# Patient Record
Sex: FEMALE
[De-identification: names, ages, dates, MRNs, and addresses within clinical notes are randomized per-mention and may not be internally consistent; named-entity substitution may affect disease eponyms.]

---

## 2020-02-12 PROBLEM — Z00.00 ENCOUNTER FOR PREVENTIVE HEALTH EXAMINATION: Status: ACTIVE | Noted: 2020-02-12

## 2020-07-15 ENCOUNTER — APPOINTMENT (OUTPATIENT)
Dept: ENDOCRINOLOGY | Facility: CLINIC | Age: 55
End: 2020-07-15

## 2020-07-15 ENCOUNTER — APPOINTMENT (OUTPATIENT)
Dept: ENDOCRINOLOGY | Facility: CLINIC | Age: 55
End: 2020-07-15
Payer: COMMERCIAL

## 2020-07-15 VITALS
WEIGHT: 133 LBS | HEART RATE: 70 BPM | HEIGHT: 62 IN | BODY MASS INDEX: 24.48 KG/M2 | SYSTOLIC BLOOD PRESSURE: 122 MMHG | DIASTOLIC BLOOD PRESSURE: 70 MMHG

## 2020-07-15 DIAGNOSIS — Z78.9 OTHER SPECIFIED HEALTH STATUS: ICD-10-CM

## 2020-07-15 DIAGNOSIS — Z86.39 PERSONAL HISTORY OF OTHER ENDOCRINE, NUTRITIONAL AND METABOLIC DISEASE: ICD-10-CM

## 2020-07-15 DIAGNOSIS — E78.5 HYPERLIPIDEMIA, UNSPECIFIED: ICD-10-CM

## 2020-07-15 DIAGNOSIS — L65.9 NONSCARRING HAIR LOSS, UNSPECIFIED: ICD-10-CM

## 2020-07-15 DIAGNOSIS — Z80.9 FAMILY HISTORY OF MALIGNANT NEOPLASM, UNSPECIFIED: ICD-10-CM

## 2020-07-15 DIAGNOSIS — Z81.8 FAMILY HISTORY OF OTHER MENTAL AND BEHAVIORAL DISORDERS: ICD-10-CM

## 2020-07-15 PROCEDURE — 99204 OFFICE O/P NEW MOD 45 MIN: CPT

## 2020-07-15 NOTE — HISTORY OF PRESENT ILLNESS
[FreeTextEntry1] : Quality: Hashimoto's hypothyroid\par Duration/Onset: Dx 20+ year ago and had been on LT4 137 mcg daily at that time but stopped it due to hair loss.  She had been on/off for several year. Prior to recent labs she had been off it for 5 years due to lack of insurance.\par Severity: severe\par Associated symptoms: denies anterior neck pain, dysphagia or voice changes. complains of hair loss with thyroid hormone. \par \par MODIFYING FACTORS: \par Medication history: as above\par Current thyroid medication: Started LT4 50 mcg 1/2020 and then switched to brand Synthroid 75 mcg daily since end of June 2020\par \par \par \par

## 2020-07-15 NOTE — CONSULT LETTER
[Dear  ___] : Dear  [unfilled], [Consult Letter:] : I had the pleasure of evaluating your patient, [unfilled]. [Please see my note below.] : Please see my note below. [Consult Closing:] : Thank you very much for allowing me to participate in the care of this patient.  If you have any questions, please do not hesitate to contact me. [Sincerely,] : Sincerely, [FreeTextEntry3] : Becky Kumar, \par

## 2020-07-15 NOTE — DATA REVIEWED
[FreeTextEntry1] : Thyroid sono 2/5/15 - heterogeneous hypervascular gland, no thyroid enlargement, no thyroid nodules\par \par Labs 1/7/20 (+) TPO ab 181, T4 1.7, , Ft4 0.21\par \par Labs 3/12/20\par \par \par \par labs 6/26/20\par \par T4 3.5\par FT4 0.51\par (+) COVID ab

## 2020-07-15 NOTE — REVIEW OF SYSTEMS
[Nocturia] : nocturia [Hair Loss] : hair loss [Insomnia] : insomnia [Cold Intolerance] : cold intolerance [Negative] : Neurological [FreeTextEntry3] : (+) reading glasses [FreeTextEntry9] : Right knee discomfort

## 2020-07-15 NOTE — ASSESSMENT
[FreeTextEntry1] : 55 year old female with hashimoto's hypothyroid with severe hypothyroidism after being off thyroid hormone for many years.  Minimal response with low dose thyroid hormone in past 6 months and she had been on much higher doses in past. SHe also has hair loss and HLD\par - restart Synthroid 137 mcg daily, repeat TFTs 8 weeks\par - risks of untreated hypothyroidism discussed, advised that she adhere with thyroid hormone daily\par - hair loss unlike due to thyroid hormone but may be worsening by untreated thyroid disease, consider derm eval after normalization of thyroid levels\par - HLD likely due to hypothyroid, no statin at this time, repeat levels when euthyroid\par - all questions answered\par

## 2020-07-15 NOTE — PHYSICAL EXAM
[Well Nourished] : well nourished [Alert] : alert [Healthy Appearance] : healthy appearance [No Acute Distress] : no acute distress [EOMI] : extra ocular movement intact [No LAD] : no lymphadenopathy [No Thyroid Nodules] : no palpable thyroid nodules [Thyroid Not Enlarged] : the thyroid was not enlarged [No Respiratory Distress] : no respiratory distress [No Accessory Muscle Use] : no accessory muscle use [Clear to Auscultation] : lungs were clear to auscultation bilaterally [Normal S1, S2] : normal S1 and S2 [Normal Rate] : heart rate was normal [No Edema] : no peripheral edema [Normal Gait] : normal gait [No Rash] : no rash [Cranial Nerves Intact] : cranial nerves 2-12 were intact [Oriented x3] : oriented to person, place, and time [Normal Affect] : the affect was normal [Normal Mood] : the mood was normal [Normal Insight/Judgement] : insight and judgment were intact [de-identified] : (+) tattoes [de-identified] : delayed relaxation phase of DTRs

## 2020-08-07 ENCOUNTER — APPOINTMENT (OUTPATIENT)
Dept: ENDOCRINOLOGY | Facility: CLINIC | Age: 55
End: 2020-08-07

## 2020-09-19 ENCOUNTER — APPOINTMENT (OUTPATIENT)
Dept: ENDOCRINOLOGY | Facility: CLINIC | Age: 55
End: 2020-09-19

## 2020-09-25 ENCOUNTER — APPOINTMENT (OUTPATIENT)
Dept: ENDOCRINOLOGY | Facility: CLINIC | Age: 55
End: 2020-09-25
Payer: COMMERCIAL

## 2020-09-25 VITALS
HEART RATE: 74 BPM | SYSTOLIC BLOOD PRESSURE: 120 MMHG | BODY MASS INDEX: 24.66 KG/M2 | HEIGHT: 62 IN | OXYGEN SATURATION: 98 % | DIASTOLIC BLOOD PRESSURE: 76 MMHG | TEMPERATURE: 97.6 F | WEIGHT: 134 LBS

## 2020-09-25 PROCEDURE — 99213 OFFICE O/P EST LOW 20 MIN: CPT

## 2020-09-25 RX ORDER — LEVOTHYROXINE SODIUM 0.12 MG/1
125 TABLET ORAL
Refills: 0 | Status: DISCONTINUED | COMMUNITY
End: 2020-09-25

## 2020-09-25 NOTE — PHYSICAL EXAM
[Alert] : alert [Well Nourished] : well nourished [Healthy Appearance] : healthy appearance [No Acute Distress] : no acute distress [EOMI] : extra ocular movement intact [No LAD] : no lymphadenopathy [Thyroid Not Enlarged] : the thyroid was not enlarged [No Thyroid Nodules] : no palpable thyroid nodules [No Respiratory Distress] : no respiratory distress [No Accessory Muscle Use] : no accessory muscle use [Clear to Auscultation] : lungs were clear to auscultation bilaterally [Normal S1, S2] : normal S1 and S2 [Normal Rate] : heart rate was normal [No Edema] : no peripheral edema [Normal Gait] : normal gait [Cranial Nerves Intact] : cranial nerves 2-12 were intact [Oriented x3] : oriented to person, place, and time [Normal Affect] : the affect was normal [Normal Insight/Judgement] : insight and judgment were intact [Normal Mood] : the mood was normal [de-identified] : (+) tattoes [de-identified] : brisk reflexes, (+) mild hand tremors

## 2020-09-25 NOTE — HISTORY OF PRESENT ILLNESS
[FreeTextEntry1] : Quality: Hashimoto's hypothyroid\par Duration/Onset: Dx 20+ year ago and had been on LT4 137 mcg daily at that time but stopped it due to hair loss.  She had been on/off for several year. Prior to initial visit she had been off it for 5 years due to lack of insurance.\par Severity: severe\par Associated symptoms: denies anterior neck pain, dysphagia or voice changes. complains of hair loss with thyroid hormone. improved hair loss but still thin\par \par MODIFYING FACTORS: \par Medication history: as above. since last visit Lt4 137 mcg daiy and has been adherent.  PCp lowered dose to 125 mcg about 2 weeks ago but pt feels that this may still be too much b/c she developed muscle pains.  She refuses to take this and has used leftover 100 mcg daily and feels better.\par \par \par \par

## 2020-09-25 NOTE — REVIEW OF SYSTEMS
[Nocturia] : nocturia [Hair Loss] : hair loss [Insomnia] : insomnia [Cold Intolerance] : cold intolerance [Recent Weight Gain (___ Lbs)] : no recent weight gain [Recent Weight Loss (___ Lbs)] : no recent weight loss [Palpitations] : no palpitations [Shortness Of Breath] : no shortness of breath [Diarrhea] : no diarrhea [Tremors] : no tremors [FreeTextEntry3] : (+) reading glasses [FreeTextEntry9] : Right knee discomfort

## 2020-09-25 NOTE — DATA REVIEWED
[FreeTextEntry1] : Thyroid sono 2/5/15 - heterogeneous hypervascular gland, no thyroid enlargement, no thyroid nodules\par \par Labs 1/7/20 (+) TPO ab 181, T4 1.7, , Ft4 0.21\par \par Labs 3/12/20\par \par \par \par labs 6/26/20\par \par T4 3.5\par FT4 0.51\par (+) COVID ab\par \par labs 9/4/20 TSH 0.130, Ft4 2.19, T4 12.7, normal chol levels

## 2020-09-25 NOTE — ASSESSMENT
[FreeTextEntry1] : 55 year old female with hashimoto's hypothyroid - now thyrotoxic after adherence with thyroid hormone\par - decrease Synthroid 100 mcg daily ( pt did not tolerate 125 mcg daily), repeat TFTs 6 weeks\par - chol levels normalized\par \par

## 2020-11-04 ENCOUNTER — NON-APPOINTMENT (OUTPATIENT)
Age: 55
End: 2020-11-04

## 2020-11-06 ENCOUNTER — APPOINTMENT (OUTPATIENT)
Dept: ENDOCRINOLOGY | Facility: CLINIC | Age: 55
End: 2020-11-06

## 2020-12-07 ENCOUNTER — APPOINTMENT (OUTPATIENT)
Dept: ENDOCRINOLOGY | Facility: CLINIC | Age: 55
End: 2020-12-07

## 2020-12-11 ENCOUNTER — LABORATORY RESULT (OUTPATIENT)
Age: 55
End: 2020-12-11

## 2020-12-11 ENCOUNTER — APPOINTMENT (OUTPATIENT)
Dept: ENDOCRINOLOGY | Facility: CLINIC | Age: 55
End: 2020-12-11
Payer: COMMERCIAL

## 2020-12-11 VITALS
HEART RATE: 88 BPM | OXYGEN SATURATION: 97 % | BODY MASS INDEX: 26.31 KG/M2 | HEIGHT: 60 IN | SYSTOLIC BLOOD PRESSURE: 110 MMHG | WEIGHT: 134 LBS | DIASTOLIC BLOOD PRESSURE: 66 MMHG

## 2020-12-11 DIAGNOSIS — E03.9 HYPOTHYROIDISM, UNSPECIFIED: ICD-10-CM

## 2020-12-11 DIAGNOSIS — M89.8X9 OTHER SPECIFIED DISORDERS OF BONE, UNSPECIFIED SITE: ICD-10-CM

## 2020-12-11 PROCEDURE — 36415 COLL VENOUS BLD VENIPUNCTURE: CPT

## 2020-12-11 PROCEDURE — 99072 ADDL SUPL MATRL&STAF TM PHE: CPT

## 2020-12-11 PROCEDURE — 99213 OFFICE O/P EST LOW 20 MIN: CPT | Mod: 25

## 2020-12-11 RX ORDER — LEVOTHYROXINE SODIUM 0.1 MG/1
100 TABLET ORAL
Qty: 90 | Refills: 1 | Status: ACTIVE | COMMUNITY
Start: 2020-09-25 | End: 1900-01-01

## 2020-12-11 NOTE — PHYSICAL EXAM
[Alert] : alert [Well Nourished] : well nourished [Healthy Appearance] : healthy appearance [No Acute Distress] : no acute distress [EOMI] : extra ocular movement intact [No LAD] : no lymphadenopathy [Thyroid Not Enlarged] : the thyroid was not enlarged [No Thyroid Nodules] : no palpable thyroid nodules [No Respiratory Distress] : no respiratory distress [No Accessory Muscle Use] : no accessory muscle use [Clear to Auscultation] : lungs were clear to auscultation bilaterally [Normal S1, S2] : normal S1 and S2 [Normal Rate] : heart rate was normal [No Edema] : no peripheral edema [Normal Gait] : normal gait [Cranial Nerves Intact] : cranial nerves 2-12 were intact [Oriented x3] : oriented to person, place, and time [Normal Affect] : the affect was normal [Normal Insight/Judgement] : insight and judgment were intact [Normal Mood] : the mood was normal [Normal Reflexes] : deep tendon reflexes were 2+ and symmetric [No Tremors] : no tremors [de-identified] : (+) tattoes

## 2020-12-11 NOTE — REVIEW OF SYSTEMS
[Hair Loss] : hair loss [Insomnia] : insomnia [Cold Intolerance] : cold intolerance [Recent Weight Gain (___ Lbs)] : no recent weight gain [Recent Weight Loss (___ Lbs)] : no recent weight loss [Palpitations] : no palpitations [Shortness Of Breath] : no shortness of breath [Diarrhea] : no diarrhea [As Noted in HPI] : as noted in HPI [Tremors] : no tremors [FreeTextEntry3] : (+) reading glasses

## 2020-12-11 NOTE — ASSESSMENT
[FreeTextEntry1] : 55 year old female with hashimoto's hypothyroid - TSH improved since 6/2020 but now slightly elevated after switching to generic LT4.  She does not want brand Synthroid and does not want to increase LT4 at this time\par - risks of hypothyroidism discussed, risks of untreated hypothyroidism discussed\par - cont LT4 100 mcg daily, repeat labs 3 months, pt will try and get same generic  (Sandos) from pharmacy\par - advised against [facility] thyroid due to dosing inconsistence\par \par (+) bone pains, stiffness unlikely due to medication- check BMP and VIt D, strongly advised PCP/rhem tomyal to work up other causes of these symtpoms.  \par \par

## 2020-12-11 NOTE — HISTORY OF PRESENT ILLNESS
[FreeTextEntry1] : Interval hx: complains  "achy bone and joint stiffness in am"  worse with brand Synthroid and improved with certain generic LT4 from specific . She does not want brand Synthroid and does not want to changes LT4 dose.   At last visit Synthroid ([facility]) 100 mcg prescibed and labs 10/2020 normal and then pt requested generic LT4 which was precribed at 100 mcg daily. Recent labs showed TSH elevation. \par \par Quality: Hashimoto's hypothyroid\par Duration/Onset: Dx 20+ year ago and had been on LT4 137 mcg daily at that time but stopped it due to hair loss.  She had been on/off for several year. Prior to initial visit she had been off it for 5 years due to lack of insurance. and TSH 6/2020 was 133.\par Severity: severe\par Associated symptoms: denies anterior neck pain, dysphagia or voice changes. complains of hair loss with thyroid hormone. improved hair loss but still thin\par \par \par \par \par

## 2020-12-11 NOTE — DATA REVIEWED
[FreeTextEntry1] : Thyroid sono 2/5/15 - heterogeneous hypervascular gland, no thyroid enlargement, no thyroid nodules\par \par Labs 1/7/20 (+) TPO ab 181, T4 1.7, , Ft4 0.21\par \par Labs 3/12/20\par \par \par \par labs 6/26/20\par \par T4 3.5\par FT4 0.51\par (+) COVID ab\par \par labs 9/4/20 TSH 0.130, Ft4 2.19, T4 12.7, normal chol levels\par \par Labs 10/30/20 TSH 1.560, FT4 1.24\par \par Labs 11/28/20 TSH 4.80, FT4 1.30

## 2020-12-17 ENCOUNTER — NON-APPOINTMENT (OUTPATIENT)
Age: 55
End: 2020-12-17

## 2020-12-30 ENCOUNTER — APPOINTMENT (OUTPATIENT)
Dept: ENDOCRINOLOGY | Facility: CLINIC | Age: 55
End: 2020-12-30

## 2021-03-24 ENCOUNTER — APPOINTMENT (OUTPATIENT)
Dept: ENDOCRINOLOGY | Facility: CLINIC | Age: 56
End: 2021-03-24